# Patient Record
Sex: FEMALE | URBAN - METROPOLITAN AREA
[De-identification: names, ages, dates, MRNs, and addresses within clinical notes are randomized per-mention and may not be internally consistent; named-entity substitution may affect disease eponyms.]

---

## 2024-04-01 ENCOUNTER — OV NP (OUTPATIENT)
Dept: URBAN - METROPOLITAN AREA CLINIC 124 | Facility: CLINIC | Age: 29
End: 2024-04-01

## 2024-04-01 RX ORDER — NORGESTIMATE AND ETHINYL ESTRADIOL 0.25-0.035
TAKE 1 TABLET BY MOUTH EVERY DAY KIT ORAL
Qty: 84 EACH | Refills: 3 | COMMUNITY

## 2024-04-01 RX ORDER — MONTELUKAST SODIUM 10 MG/1
TAKE 1 TABLET BY MOUTH EVERY DAY TABLET, FILM COATED ORAL
Qty: 30 EACH | Refills: 0 | COMMUNITY

## 2024-04-01 RX ORDER — NORGESTIMATE AND ETHINYL ESTRADIOL 0.25-0.035
TAKE 1 TABLET BY MOUTH EVERY DAY KIT ORAL
Qty: 84 EACH | Refills: 2 | COMMUNITY

## 2024-04-01 NOTE — HPI-TODAY'S VISIT:
28 yo fem ref for a gi consultation of nausea and a copy of this note will be sent to the ref provider.